# Patient Record
Sex: MALE | Race: WHITE | NOT HISPANIC OR LATINO | Employment: OTHER | URBAN - METROPOLITAN AREA
[De-identification: names, ages, dates, MRNs, and addresses within clinical notes are randomized per-mention and may not be internally consistent; named-entity substitution may affect disease eponyms.]

---

## 2024-11-21 DIAGNOSIS — I34.0 NONRHEUMATIC MITRAL VALVE REGURGITATION: Primary | ICD-10-CM

## 2024-12-09 ENCOUNTER — OFFICE VISIT (OUTPATIENT)
Dept: CARDIOLOGY | Facility: HOSPITAL | Age: 79
End: 2024-12-09
Payer: MEDICARE

## 2024-12-09 ENCOUNTER — OFFICE VISIT (OUTPATIENT)
Dept: CARDIAC SURGERY | Facility: HOSPITAL | Age: 79
End: 2024-12-09
Payer: MEDICARE

## 2024-12-09 VITALS
SYSTOLIC BLOOD PRESSURE: 145 MMHG | BODY MASS INDEX: 19.53 KG/M2 | DIASTOLIC BLOOD PRESSURE: 93 MMHG | HEART RATE: 60 BPM | OXYGEN SATURATION: 96 % | WEIGHT: 136.4 LBS | HEIGHT: 70 IN

## 2024-12-09 DIAGNOSIS — I34.0 NONRHEUMATIC MITRAL VALVE REGURGITATION: ICD-10-CM

## 2024-12-09 DIAGNOSIS — I34.0 NONRHEUMATIC MITRAL VALVE REGURGITATION: Primary | ICD-10-CM

## 2024-12-09 PROCEDURE — 99215 OFFICE O/P EST HI 40 MIN: CPT | Mod: GC | Performed by: THORACIC SURGERY (CARDIOTHORACIC VASCULAR SURGERY)

## 2024-12-09 PROCEDURE — 1159F MED LIST DOCD IN RCRD: CPT | Performed by: INTERNAL MEDICINE

## 2024-12-09 PROCEDURE — 99205 OFFICE O/P NEW HI 60 MIN: CPT | Performed by: THORACIC SURGERY (CARDIOTHORACIC VASCULAR SURGERY)

## 2024-12-09 PROCEDURE — 99204 OFFICE O/P NEW MOD 45 MIN: CPT | Performed by: INTERNAL MEDICINE

## 2024-12-09 PROCEDURE — 99214 OFFICE O/P EST MOD 30 MIN: CPT | Performed by: INTERNAL MEDICINE

## 2024-12-09 RX ORDER — AMLODIPINE BESYLATE 2.5 MG/1
2.5 TABLET ORAL DAILY
COMMUNITY

## 2024-12-09 RX ORDER — WARFARIN SODIUM 5 MG/1
5 TABLET ORAL ONCE
COMMUNITY

## 2024-12-09 RX ORDER — ROSUVASTATIN CALCIUM 5 MG/1
5 TABLET, COATED ORAL DAILY
COMMUNITY

## 2024-12-09 RX ORDER — FUROSEMIDE 20 MG/1
20 TABLET ORAL DAILY
COMMUNITY

## 2024-12-09 RX ORDER — CLOPIDOGREL BISULFATE 75 MG/1
75 TABLET ORAL DAILY
COMMUNITY

## 2024-12-09 RX ORDER — METOPROLOL TARTRATE 25 MG/1
25 TABLET, FILM COATED ORAL 2 TIMES DAILY
COMMUNITY

## 2024-12-09 RX ORDER — ALPRAZOLAM 0.5 MG/1
0.5 TABLET ORAL NIGHTLY PRN
COMMUNITY

## 2024-12-11 ENCOUNTER — TELEPHONE (OUTPATIENT)
Dept: CARDIOLOGY | Facility: HOSPITAL | Age: 79
End: 2024-12-11
Payer: MEDICARE

## 2024-12-11 DIAGNOSIS — I34.0 NONRHEUMATIC MITRAL VALVE REGURGITATION: Primary | ICD-10-CM

## 2024-12-11 NOTE — TELEPHONE ENCOUNTER
Called & notified to expect a call from LONG lab to schedule LONG. Notified lab work needed prior. He agrees to go to [facility] for lab work. Requisitions faxed

## 2024-12-11 NOTE — PROGRESS NOTES
Cards: Dr. Bucio    HPI    79 y.o. y/o male with PMH of CKD stage 3, HLP, HTN, chronic afib, anxiety and depression. Presents for evaluation of severe, symptomatic mitral regurgitation.  Patient relates history of worsening fatigue during past months. He can perform daily activities but reports SOB when on exertion like going upstairs. Walking from the parking lot to the clinic he needed to pause to rest.   Denies overt orthopnea, PND, exertional CP.  Denies syncope or presyncope.  Denies increased abdominal girth, edema.  Gradually doing less and less activity secondary to symptoms.    Full 14 point ROS complete and negative except as noted above.     Echo: 55 EF , severe mitral regurgitation, mod tricuspid regurgitation    EKG: atrial flutter, 106 QRS. Premature ventricular complexes    MTEER Workup:   - NYHA: 2  - LHC: 08/29/24- no major obstructions  - LONG: pend  - dental clearance: will need appointment     EFT 1/5  STS   Procedure Type: Isolated MVR  Perioperative Outcome Estimate %  Operative Mortality 2.85%  Morbidity & Mortality 11.2%  Stroke 2.43%  Renal Failure 1.61%  Reoperation 4.48%  Prolonged Ventilation 5.01%  Deep Sternal Wound Infection 0.024%  Long Hospital Stay (>14 days) 7.49%  Short Hospital Stay (<6 days)* 23%      Procedure Type: Isolated MVr  Perioperative Outcome Estimate %  Operative Mortality 1.65%  Morbidity & Mortality 6.7%  Stroke 2.09%  Renal Failure 0.889%  Reoperation 3.06%  Prolonged Ventilation 3%  Deep Sternal Wound Infection 0.024%  Long Hospital Stay (>14 days) 3.77%  Short Hospital Stay (<6 days)* 40.2%        Social History     Tobacco Use    Smoking status: Never    Smokeless tobacco: Never   Substance Use Topics    Alcohol use: Not Currently        No family history on file.     Allergies   Allergen Reactions    Penicillins Hives    Shellfish Derived Hives        Current Outpatient Medications   Medication Instructions    ALPRAZolam (XANAX) 0.5 mg, oral, Nightly PRN, 2  times a day    amLODIPine (NORVASC) 2.5 mg, oral, Daily    clopidogrel (PLAVIX) 75 mg, oral, Daily    furosemide (LASIX) 20 mg, oral, Daily, Mon. Wed. Fri.    metoprolol tartrate (LOPRESSOR) 25 mg, oral, 2 times daily    rosuvastatin (CRESTOR) 5 mg, oral, Daily    warfarin (COUMADIN) 5 mg, oral, Once, Take as directed per After Visit Summary.        Vitals:    12/09/24 0939   BP: (!) 145/93   Pulse: 60   SpO2: 96%        Physical Exam:    General:  Alert, calm, well-developed   HEENT:  Pupils equal, round, reactive to light and accommodation. Extraocular movements   Neck:  Supple, without lymphadenopathy or thyromegaly. No carotid bruits.  Lymph:  No axillary, cervical, supraclavicular, pre-auricular, submental, or occipital lymphadenopathy,  Cardiovascular:  Regular rate and rhythm, with normal S1 and S2. Systolic murmurs 3/6, no rubs or gallops. No JVD. 2+ pulses bilaterally - dorsalis pedis and radial.  Lungs:  lung clear. No wheezes. No accessory muscle use or cyanosis. No tenderness to palpation.  Abdomen:  Normoactive bowel sounds. Soft, flat, non-tender, and non-distended. No hepatosplenomegaly; liver span approximately 10 cm.  Skin:  Warm, dry, well-perfused. No rashes or other lesions.  Extremities:  2+ pulses in upper and lower extremities. No lower extremity pain or edema; legs are symmetric in appearance.  Neuro:  Alert and oriented to person, place, and time. Able to communicate well. 5/5 strength in all extremities bilaterally. Sensation intact in all extremities. Normal gait.       KCCQ Questionnaire      1  Heart failure affects different people in different ways. Some feel shortness of breath while others feel fatigue. Please indicate how much you are limited by heart failure (shortness of breath or fatigue) in your ability to do the following activities over the past 2 weeks. 1 month Structural Heart NP follow-up     A.) Showering/bathing  4. Slightly  B.) Walking 1 block on level ground 2. Quite a  bit  C.) Hurrying or Jogging   1. Extremely    2.  Over the past 2 weeks, how many times did you have swelling in your feet, ankles or legs when you woke up in the morning? 5. Never    3.  Over the past 2 weeks, on average, how many times has fatigue limited your ability to do what you wanted? 1. All the time    4.  Over the past 2 weeks, on average, how many times has shortness of breath limited your ability to do what you wanted? 2. Several times a day    5.  Over the past 2 weeks, on average, how many times have you been forced to sleep sitting up in a chair or with at least 3 pillows to prop you up because of shortness of breath? Less than once a week    6. Over the past 2 weeks, how much has your heart failure limited your enjoyment of life? It has extremely limited my enjoyment of life    7. If you had to spend the rest of your life with your heart failure the way it is right now, how would you feel about this? 1. Not at all     8. How much does your heart failure affect your lifestyle? Please indicate how your heart failure may have limited yourparticipation in the following activities over the past 2 weeks    A.)  Hobbies, recreational activities  6. Does not apply for other reasons    B.) Working or doing household chores  6. Does not apply for other reasons    C.) Visiting family or friends out of your home  6. Does not apply for other reasons    5m Walk:    11.3 sec    Impression:   79 y.o. y/o male with PMH of CKD stage 3, HLP, HTN, chronic afib, anxiety and depression. Presents for evaluation of severe, symptomatic mitral regurgitation.  Patient relates history of worsening fatigue during past months. He can perform daily activities but reports SOB when on exertion like going upstairs. Walking from the parking lot to the clinic he needed to pause to rest. We will proceed with the MTEER workup for this patient    Plan:   He will need a LONG    The overall decision regarding the best treatment for this  condition is complex.  We discussed options of both surgical and transcatheter treatment along with risks and benefits involved with both of them in detail.    We discussed all the risks associated with the procedure, including but not limited to stroke, MI, pericardial tamponade, vascular complications, infection and death were discussed with the patient. The patient verbalized understanding and decided to proceed with the procedure.     We will discuss this patient's case at our Valve Team meeting with representatives from Structural Heart and Cardiac Surgery. Our nurse navigators will contact patient with further diagnostic needs and formal plan.

## 2024-12-12 ENCOUNTER — TELEPHONE (OUTPATIENT)
Dept: CARDIOLOGY | Facility: HOSPITAL | Age: 79
End: 2024-12-12

## 2024-12-13 LAB
NON-UH HIE BUN/CREAT RATIO: 13.3
NON-UH HIE BUN: 20 MG/DL (ref 9–23)
NON-UH HIE CALCIUM: 9.6 MG/DL (ref 8.7–10.4)
NON-UH HIE CALCULATED OSMOLALITY: 285 MOSM/KG (ref 275–295)
NON-UH HIE CHLORIDE: 103 MMOL/L (ref 98–107)
NON-UH HIE CO2, VENOUS: 28 MMOL/L (ref 20–31)
NON-UH HIE CREATININE: 1.5 MG/DL (ref 0.6–1.1)
NON-UH HIE GFR AA: 54
NON-UH HIE GLOMERULAR FILTRATION RATE: 45 ML/MIN/1.73M?
NON-UH HIE GLUCOSE: 112 MG/DL (ref 74–106)
NON-UH HIE HCT: 42.4 % (ref 41–52)
NON-UH HIE HGB: 14.3 G/DL (ref 13.5–17.5)
NON-UH HIE INR: 3.8
NON-UH HIE INSTR WBC ND: 7.8
NON-UH HIE K: 4.2 MMOL/L (ref 3.5–5.1)
NON-UH HIE MCH: 30.3 PG (ref 27–34)
NON-UH HIE MCHC: 33.7 G/DL (ref 32–37)
NON-UH HIE MCV: 90 FL (ref 80–100)
NON-UH HIE MPV: 8 FL (ref 7.4–10.4)
NON-UH HIE NA: 141 MMOL/L (ref 135–145)
NON-UH HIE PLATELET: 199 X10 (ref 150–450)
NON-UH HIE PROTIME PATIENT: 44.1 SECONDS (ref 9.8–12.8)
NON-UH HIE RBC: 4.71 X10 (ref 4.7–6.1)
NON-UH HIE RDW: 14.9 % (ref 11.5–14.5)
NON-UH HIE WBC: 7.8 X10 (ref 4.5–11)

## 2025-01-07 ENCOUNTER — APPOINTMENT (OUTPATIENT)
Dept: CARDIOLOGY | Facility: HOSPITAL | Age: 80
End: 2025-01-07
Payer: MEDICARE

## 2025-01-15 ENCOUNTER — TELEPHONE (OUTPATIENT)
Dept: CARDIOLOGY | Facility: HOSPITAL | Age: 80
End: 2025-01-15
Payer: MEDICARE

## 2025-01-15 NOTE — TELEPHONE ENCOUNTER
Called regarding cancelled 1-7-25 LONG. He reported he is feeling better & plans to call LONG lab back next week to schedule.

## 2025-01-20 ENCOUNTER — DOCUMENTATION (OUTPATIENT)
Dept: CARDIOLOGY | Facility: HOSPITAL | Age: 80
End: 2025-01-20
Payer: MEDICARE

## 2025-01-20 NOTE — PROGRESS NOTES
KCCQ Questionnaire      1  Heart failure affects different people in different ways. Some feel shortness of breath while others feel fatigue. Please indicate how much you are limited by heart failure (shortness of breath or fatigue) in your ability to do the following activities over the past 2 weeks. PRE PROCEDURE    A.) Showering/bathing  4. Slightly  B.) Walking 1 block on level ground 2. Quite a bit  C.) Hurrying or Jogging   1. Extremely    2.  Over the past 2 weeks, how many times did you have swelling in your feet, ankles or legs when you woke up in the morning? 5. Never    3.  Over the past 2 weeks, on average, how many times has fatigue limited your ability to do what you wanted? 1. All the time    4.  Over the past 2 weeks, on average, how many times has shortness of breath limited your ability to do what you wanted? 2. Several times a day    5.  Over the past 2 weeks, on average, how many times have you been forced to sleep sitting up in a chair or with at least 3 pillows to prop you up because of shortness of breath? Less than once a week    6. Over the past 2 weeks, how much has your heart failure limited your enjoyment of life? It has extremely limited my enjoyment of life    7. If you had to spend the rest of your life with your heart failure the way it is right now, how would you feel about this? 1. Not at all     8. How much does your heart failure affect your lifestyle? Please indicate how your heart failure may have limited yourparticipation in the following activities over the past 2 weeks    A.)  Hobbies, recreational activities  4. Slightly limited    B.) Working or doing household chores  4. Slightly limited    C.) Visiting family or friends out of your home  4. Slightly limited    5 Meter Walk____11.3______seconds

## 2025-02-05 ENCOUNTER — TELEPHONE (OUTPATIENT)
Dept: CARDIOLOGY | Facility: HOSPITAL | Age: 80
End: 2025-02-05
Payer: MEDICARE

## 2025-02-05 NOTE — TELEPHONE ENCOUNTER
Called & spouse answered. She reported her son will be taking him is currently ill. Provided LONG lab contact number to call to re-schedule when available.

## 2025-02-18 ENCOUNTER — APPOINTMENT (OUTPATIENT)
Dept: CARDIOLOGY | Facility: HOSPITAL | Age: 80
End: 2025-02-18
Payer: MEDICARE

## 2025-03-31 ENCOUNTER — CARDIOLOGY CONFERENCE (OUTPATIENT)
Dept: CARDIOLOGY | Facility: HOSPITAL | Age: 80
End: 2025-03-31
Payer: MEDICARE

## 2025-03-31 ENCOUNTER — TELEPHONE (OUTPATIENT)
Dept: CARDIOLOGY | Facility: HOSPITAL | Age: 80
End: 2025-03-31
Payer: MEDICARE

## 2025-03-31 NOTE — TELEPHONE ENCOUNTER
Called & he reported dental appt. 4-28 for cleaning & initial consult oral surgeon scheduled for 4-30-25. Notified plan to schedule LONG same day as M-SARITA procedure & need dental clearance prior to scheduling.

## 2025-03-31 NOTE — PROGRESS NOTES
Valve and Structural Heart Multidisciplinary Meeting   This note reflects a summary of a case conference discussion between a multidisciplinary team of interventional cardiologists, cardiac surgeons, APPs, nurse navigators, and research team.  The suggestions and impressions in this note reflect group consensus opinion but do not substitute bedside evaluation and management by the primary team.     Attendees:  Chris San, Dr. Gamez, Alex Reid, Preet Vargas, Jacque Navarro, Raya Ireland, Dr. Mitchell, Isidro Dobbs, Raya Ireland, Cathy Cruz, Myah Sage, Nikki Grider, Dr. Doan, Dr. Lagos, Dr. Clemons    Elijah Talavera . is a 80 y.o. year old male  Medical record number: [id number]    Referring Provider Dr. Bucio    Brief Clinical Summary - clinical presentation/comorbidities:  79 y.o. y/o male with PMH of CKD stage 3, HLP, HTN, chronic afib, anxiety and depression. Presents for evaluation of severe, symptomatic mitral regurgitation.  Patient relates history of worsening fatigue during past months. He can perform daily activities but reports SOB when on exertion like going upstairs. Walking from the parking lot to the clinic he needed to pause to rest.   Valve and Structural Heart Work Up  Echo: 55 EF , severe mitral regurgitation, mod tricuspid regurgitation    EKG: atrial flutter, 106 QRS. Premature ventricular complexes  - NYHA: 2  - LHC: 08/29/24- no major obstructions  - LONG: 3/26/2025  - dental clearance: will need appointment   EFT 1/5  STS  Procedure Type: Isolated MVR  Perioperative OutcomeEstimate %  Operative Mortality2.85%  Procedure Type: Isolated MVr  Perioperative OutcomeEstimate %  Operative Mortality1.65%  KCCQ/Walk done  Group consensus recommendation: Given his age and co-morbidities and his age, I believe a cathter based approach is in his best interest. He has functional mitral regurgitation.  He is a high risk surgical patient.   Dr. Bucio could not pass LONG probe with  moderate sedation.  Possible option to bring him here M SARITA with ICE.  LONG under general anesthesia.    TTE shows good M SARITA.    Conclusion:  plan M SARITA with LONG same day and ICE available with Amelie Leon.      To contact the  [facility] contact  Transfer Center or the office [phone].

## 2025-05-14 ENCOUNTER — TELEPHONE (OUTPATIENT)
Dept: CARDIOLOGY | Facility: HOSPITAL | Age: 80
End: 2025-05-14
Payer: MEDICARE

## 2025-05-14 NOTE — TELEPHONE ENCOUNTER
Called 7 he reported he has an appt. With Dr. Bucio on 5-16-25. He wanted to see him before scheduling the dental work, Dental office reported they received medical clearance & instructions for a 4 day warfarin hold for dental work. Dental office awaiting call back from Elijah Talavera to schedule dental extractions. Instructed him to call office back with date when scheduled.

## 2025-06-25 ENCOUNTER — TELEPHONE (OUTPATIENT)
Dept: CARDIOLOGY | Facility: HOSPITAL | Age: 80
End: 2025-06-25
Payer: MEDICARE

## 2025-06-25 NOTE — TELEPHONE ENCOUNTER
Called & left message to call office back regarding dental clearance needed with update on getting dental work completed

## 2025-07-21 ENCOUNTER — TELEPHONE (OUTPATIENT)
Dept: CARDIOLOGY | Facility: HOSPITAL | Age: 80
End: 2025-07-21
Payer: MEDICARE

## 2025-07-21 NOTE — TELEPHONE ENCOUNTER
Called & he reported still needs to make appt. For extractions. Reported his spouse in the hospital currently. He agreed to call office back once dental appt. Scheduled for extractions.